# Patient Record
Sex: FEMALE | Race: WHITE | Employment: FULL TIME | URBAN - METROPOLITAN AREA
[De-identification: names, ages, dates, MRNs, and addresses within clinical notes are randomized per-mention and may not be internally consistent; named-entity substitution may affect disease eponyms.]

---

## 2023-12-28 DIAGNOSIS — M25.561 RIGHT KNEE PAIN, UNSPECIFIED CHRONICITY: Primary | ICD-10-CM

## 2024-01-26 ENCOUNTER — OFFICE VISIT (OUTPATIENT)
Age: 49
End: 2024-01-26
Payer: COMMERCIAL

## 2024-01-26 VITALS — HEIGHT: 65 IN | BODY MASS INDEX: 39.99 KG/M2 | WEIGHT: 240 LBS

## 2024-01-26 DIAGNOSIS — G89.29 CHRONIC PAIN OF RIGHT KNEE: Primary | ICD-10-CM

## 2024-01-26 DIAGNOSIS — M25.561 CHRONIC PAIN OF RIGHT KNEE: Primary | ICD-10-CM

## 2024-01-26 DIAGNOSIS — M17.11 PRIMARY OSTEOARTHRITIS OF RIGHT KNEE: ICD-10-CM

## 2024-01-26 PROCEDURE — 99203 OFFICE O/P NEW LOW 30 MIN: CPT | Performed by: ORTHOPAEDIC SURGERY

## 2024-01-26 PROCEDURE — 20611 DRAIN/INJ JOINT/BURSA W/US: CPT | Performed by: ORTHOPAEDIC SURGERY

## 2024-01-26 RX ORDER — DEXTROAMPHETAMINE SACCHARATE, AMPHETAMINE ASPARTATE MONOHYDRATE, DEXTROAMPHETAMINE SULFATE AND AMPHETAMINE SULFATE 7.5; 7.5; 7.5; 7.5 MG/1; MG/1; MG/1; MG/1
1 CAPSULE, EXTENDED RELEASE ORAL EVERY MORNING
COMMUNITY

## 2024-01-26 RX ORDER — TRIAMCINOLONE ACETONIDE 40 MG/ML
40 INJECTION, SUSPENSION INTRA-ARTICULAR; INTRAMUSCULAR ONCE
Status: COMPLETED | OUTPATIENT
Start: 2024-01-26 | End: 2024-01-26

## 2024-01-26 RX ORDER — DEXTROAMPHETAMINE SACCHARATE, AMPHETAMINE ASPARTATE, DEXTROAMPHETAMINE SULFATE AND AMPHETAMINE SULFATE 2.5; 2.5; 2.5; 2.5 MG/1; MG/1; MG/1; MG/1
10 TABLET ORAL DAILY
COMMUNITY
Start: 2024-02-03 | End: 2024-05-03

## 2024-01-26 RX ORDER — SULFAMETHOXAZOLE AND TRIMETHOPRIM 400; 80 MG/1; MG/1
TABLET ORAL
COMMUNITY

## 2024-01-26 RX ORDER — OMEPRAZOLE 40 MG/1
CAPSULE, DELAYED RELEASE ORAL
COMMUNITY
Start: 2023-11-09

## 2024-01-26 RX ORDER — ALBUTEROL SULFATE 90 UG/1
2 AEROSOL, METERED RESPIRATORY (INHALATION)
COMMUNITY
Start: 2023-10-03

## 2024-01-26 RX ORDER — LIDOCAINE HYDROCHLORIDE 10 MG/ML
9 INJECTION, SOLUTION INFILTRATION; PERINEURAL ONCE
Status: COMPLETED | OUTPATIENT
Start: 2024-01-26 | End: 2024-01-26

## 2024-01-26 RX ADMIN — TRIAMCINOLONE ACETONIDE 40 MG: 40 INJECTION, SUSPENSION INTRA-ARTICULAR; INTRAMUSCULAR at 14:45

## 2024-01-26 RX ADMIN — LIDOCAINE HYDROCHLORIDE 9 ML: 10 INJECTION, SOLUTION INFILTRATION; PERINEURAL at 14:45

## 2024-01-26 NOTE — PROGRESS NOTES
Name: Rochelle Abdi    : 1975     [facility]THOPAEDICS AND [facility]IC[facility]  [address]  [address]  [place] [address]  Dept: [phone]  Dept Fax: [phone]     Chief Complaint   Patient presents with    Knee Pain        Ht 1.651 m (5' 5\")   Wt 108.9 kg (240 lb)   BMI 39.94 kg/m²      No Known Allergies     Current Outpatient Medications   Medication Sig Dispense Refill    albuterol sulfate HFA (PROVENTIL;VENTOLIN;PROAIR) 108 (90 Base) MCG/ACT inhaler Inhale 2 puffs into the lungs      omeprazole (PRILOSEC) 40 MG delayed release capsule TAKE 1 CAPSULE BY MOUTH TWICE DAILY 30 MINUTES BEFORE BREAKFAST AND DINNER      amphetamine-dextroamphetamine (ADDERALL XR) 30 MG extended release capsule Take 1 capsule by mouth every morning. Max Daily Amount: 1 capsule      [START ON 2/3/2024] amphetamine-dextroamphetamine (ADDERALL) 10 MG tablet Take 1 tablet by mouth daily. Max Daily Amount: 10 mg      sulfamethoxazole-trimethoprim (BACTRIM;SEPTRA) 400-80 MG per tablet TAKE 1 TABLET BY MOUTH ONCE FOR 1 DOSE IMMEDIATELY BEFORE OR FOLLOWING INTERCOURSE       Current Facility-Administered Medications   Medication Dose Route Frequency Provider Last Rate Last Admin    lidocaine 1 % injection 9 mL  9 mL Other Once Shiraz Candelario MD        triamcinolone acetonide (KENALOG-40) injection 40 mg  40 mg Other Once Shiraz Candelario MD          There is no problem list on file for this patient.     Family History   Problem Relation Age of Onset    No Known Problems Mother     No Known Problems Father        No past surgical history on file.   Past Medical History:   Diagnosis Date    Cancer (HCC)         I have reviewed and agree with PFSH and ROS and intake form in chart and the record furthermore I have reviewed prior medical record(s) regarding this patients care during this appointment.     Review of Systems:   Patient is a pleasant appearing individual, appropriately

## 2024-01-26 NOTE — PATIENT INSTRUCTIONS
You have knee pain and a fever or rash.     You have such bad pain that you cannot use your knee.   Watch closely for changes in your health, and be sure to contact your doctor if you have any problems.  Where can you learn more?  Go to [email or web address].[email or web address] and enter W187 to learn more about \"Knee Arthritis: Care Instructions.\"  Current as of: March 9, 2022               Content Version: 13.5  © 2006-2022 [employer].   Care instructions adapted under license by [employer]. If you have questions about a medical condition or this instruction, always ask your healthcare professional. [employer] disclaims any warranty or liability for your use of this information.

## 2025-04-29 ENCOUNTER — OFFICE VISIT (OUTPATIENT)
Age: 50
End: 2025-04-29
Payer: COMMERCIAL

## 2025-04-29 DIAGNOSIS — G89.29 CHRONIC PAIN OF RIGHT KNEE: ICD-10-CM

## 2025-04-29 DIAGNOSIS — M25.561 CHRONIC PAIN OF RIGHT KNEE: ICD-10-CM

## 2025-04-29 DIAGNOSIS — M17.11 PRIMARY OSTEOARTHRITIS OF RIGHT KNEE: Primary | ICD-10-CM

## 2025-04-29 PROCEDURE — 20611 DRAIN/INJ JOINT/BURSA W/US: CPT

## 2025-04-29 RX ORDER — TRIAMCINOLONE ACETONIDE 40 MG/ML
40 INJECTION, SUSPENSION INTRA-ARTICULAR; INTRAMUSCULAR ONCE
Status: COMPLETED | OUTPATIENT
Start: 2025-04-29 | End: 2025-04-29

## 2025-04-29 RX ORDER — LIDOCAINE HYDROCHLORIDE 10 MG/ML
9 INJECTION, SOLUTION INFILTRATION; PERINEURAL ONCE
Status: COMPLETED | OUTPATIENT
Start: 2025-04-29 | End: 2025-04-29

## 2025-04-29 RX ADMIN — TRIAMCINOLONE ACETONIDE 40 MG: 40 INJECTION, SUSPENSION INTRA-ARTICULAR; INTRAMUSCULAR at 13:31

## 2025-04-29 RX ADMIN — LIDOCAINE HYDROCHLORIDE 9 ML: 10 INJECTION, SOLUTION INFILTRATION; PERINEURAL at 13:32

## 2025-04-29 NOTE — PROGRESS NOTES
Name: Rochelle Abdi    : 1975        2024     2:16 PM   Ambulatory Bariatric Summary   Weight - Scale 240   Height 1.651 m (5' 5\")   BMI 40 kg/m2   Weight - Scale 108.9 kg (240 lb)   BMI (Calculated) 40       There is no height or weight on file to calculate BMI.    Service Dept: [facility] ORTHOPAEDICS AND SPORTS MEDICINE  [address] [place] [address] A  [facility] [address]  Dept: [phone]  Dept Fax: [phone]     Patient's Pharmacies:  No Pharmacies Listed     Chief Complaint   Patient presents with    Injections     RIGHT KNEE       HPI:  Patient presents for chief complaint of right knee pain, throbbing, burning pain.  Previously diagnosed with severe osteoarthritis of the right knee and patient continues to have difficulties with it. Pain is a 7-8/10.  Patient received cortisone injection in 2024 and reports adequate relief for approximately 6 months.     There were no vitals taken for this visit.   No Known Allergies   Current Outpatient Medications   Medication Sig Dispense Refill    albuterol sulfate HFA (PROVENTIL;VENTOLIN;PROAIR) 108 (90 Base) MCG/ACT inhaler Inhale 2 puffs into the lungs      amphetamine-dextroamphetamine (ADDERALL XR) 30 MG extended release capsule Take 1 capsule by mouth every morning. Max Daily Amount: 1 capsule      amphetamine-dextroamphetamine (ADDERALL) 10 MG tablet Take 1 tablet by mouth daily. Max Daily Amount: 10 mg      omeprazole (PRILOSEC) 40 MG delayed release capsule TAKE 1 CAPSULE BY MOUTH TWICE DAILY 30 MINUTES BEFORE BREAKFAST AND DINNER      sulfamethoxazole-trimethoprim (BACTRIM;SEPTRA) 400-80 MG per tablet TAKE 1 TABLET BY MOUTH ONCE FOR 1 DOSE IMMEDIATELY BEFORE OR FOLLOWING INTERCOURSE       Current Facility-Administered Medications   Medication Dose Route Frequency Provider Last Rate Last Admin    lidocaine 1 % injection 9 mL  9 mL Other Once         triamcinolone acetonide